# Patient Record
Sex: MALE | Race: BLACK OR AFRICAN AMERICAN | NOT HISPANIC OR LATINO | URBAN - METROPOLITAN AREA
[De-identification: names, ages, dates, MRNs, and addresses within clinical notes are randomized per-mention and may not be internally consistent; named-entity substitution may affect disease eponyms.]

---

## 2018-06-01 ENCOUNTER — OFFICE VISIT - HEALTHEAST (OUTPATIENT)
Dept: FAMILY MEDICINE | Facility: CLINIC | Age: 28
End: 2018-06-01

## 2018-06-01 DIAGNOSIS — J02.0 STREP PHARYNGITIS: ICD-10-CM

## 2018-06-01 LAB — DEPRECATED S PYO AG THROAT QL EIA: ABNORMAL

## 2018-06-01 ASSESSMENT — MIFFLIN-ST. JEOR: SCORE: 2086.63

## 2021-06-01 VITALS — BODY MASS INDEX: 32.51 KG/M2 | HEIGHT: 72 IN | WEIGHT: 240 LBS

## 2021-06-18 NOTE — PROGRESS NOTES
Assessment:       Strep throat.      Plan:       Patient placed on antibiotics.  Use of OTC analgesics recommended as well as salt water gargles.   Probiotics  Change toothbrush in 24 hours  Discussed signs of worsening symptoms and when to follow-up with PCP if no symptom improvement.      Patient Instructions   Your rapid strep test was positive today. We will treat with a course of antibiotics. Please complete the full course of antibiotics. You can take your medication with food and with a probiotic such as Culturelle to prevent stomach irritation. You will be contagious for 24 hours following initiation of the medication.    You may use Tylenol or Motrin for pain and fevers.    May drink warm tea, gargle saline solution, or use throat lozenges to sooth throat pain.    Change toothbrush after 24 hours of starting the antibiotic to prevent reinfection.    Watch for resolution of symptoms in the next few days. If you continue to have high fevers, begin to have difficulty swallowing or breathing, if you notice neck pain or difficulty moving neck, please return to clinic or present to the ER immediately.  Otherwise, follow up with your PCP as needed.      Subjective:        History was provided by the patient.  Saladine H Broadway is a 27 y.o. male who presents for evaluation of a sore throat. Associated symptoms include fever of 101.9 max and fatigue. Onset of symptoms was 4 days ago, gradually worsening since that time.  He is drinking plenty of fluids. He has not had recent close exposure to someone with proven streptococcal pharyngitis. He denies cough and rhinorrhea. Treatment has included tylenol and ibuprofen last used yesterday. Denies previous medical conditions, surgeries, daily medications, and no known allergies.    The following portions of the patient's history were reviewed and updated as appropriate: allergies, current medications and problem list.    Review of Systems  Pertinent items are noted in  HPI.    Allergies  No Known Allergies      Objective:       /78  Pulse 100  Temp (!) 101.9  F (38.8  C) (Oral)   Resp 20  Ht 6' (1.829 m)  Wt (!) 240 lb (108.9 kg)  SpO2 98%  BMI 32.55 kg/m2  General appearance: alert, appears stated age, cooperative, no distress and non-toxic  Head: Normocephalic, without obvious abnormality, atraumatic  Ears: normal TM's and external ear canals both ears  Nose: no discharge  Throat: moderate tonsil swelling with erythema, no exudate; MMM, lips and tongue normal  Neck: marked anterior cervical adenopathy and supple, symmetrical, trachea midline  Lungs: clear to auscultation bilaterally and no rhonchi, rales, or wheezing  Heart: regular rate and rhythm, S1, S2 normal, no murmur, click, rub or gallop    Lab Results    Recent Results (from the past 24 hour(s))   Rapid Strep A Screen-Throat   Result Value Ref Range    Rapid Strep A Antigen Group A Strep detected (!) No Group A Strep detected, presumptive negative     I personally reviewed these results and discussed findings with the patient.